# Patient Record
Sex: FEMALE | Race: WHITE | NOT HISPANIC OR LATINO | URBAN - METROPOLITAN AREA
[De-identification: names, ages, dates, MRNs, and addresses within clinical notes are randomized per-mention and may not be internally consistent; named-entity substitution may affect disease eponyms.]

---

## 2019-03-24 ENCOUNTER — INPATIENT (INPATIENT)
Facility: HOSPITAL | Age: 29
LOS: 1 days | Discharge: HOME | End: 2019-03-26
Attending: SPECIALIST | Admitting: SPECIALIST
Payer: MEDICAID

## 2019-03-24 VITALS
OXYGEN SATURATION: 100 % | HEART RATE: 71 BPM | SYSTOLIC BLOOD PRESSURE: 155 MMHG | TEMPERATURE: 98 F | RESPIRATION RATE: 18 BRPM | DIASTOLIC BLOOD PRESSURE: 109 MMHG

## 2019-03-24 LAB
ALBUMIN SERPL ELPH-MCNC: 4.7 G/DL — SIGNIFICANT CHANGE UP (ref 3.3–5)
ALP SERPL-CCNC: 50 U/L — SIGNIFICANT CHANGE UP (ref 40–120)
ALT FLD-CCNC: 27 U/L — SIGNIFICANT CHANGE UP (ref 4–33)
ANION GAP SERPL CALC-SCNC: 14 MMO/L — SIGNIFICANT CHANGE UP (ref 7–14)
APPEARANCE UR: CLEAR — SIGNIFICANT CHANGE UP
AST SERPL-CCNC: 21 U/L — SIGNIFICANT CHANGE UP (ref 4–32)
BACTERIA # UR AUTO: NEGATIVE — SIGNIFICANT CHANGE UP
BASOPHILS # BLD AUTO: 0.1 K/UL — SIGNIFICANT CHANGE UP (ref 0–0.2)
BASOPHILS NFR BLD AUTO: 0.8 % — SIGNIFICANT CHANGE UP (ref 0–2)
BILIRUB SERPL-MCNC: 0.3 MG/DL — SIGNIFICANT CHANGE UP (ref 0.2–1.2)
BILIRUB UR-MCNC: NEGATIVE — SIGNIFICANT CHANGE UP
BLOOD UR QL VISUAL: SIGNIFICANT CHANGE UP
BUN SERPL-MCNC: 10 MG/DL — SIGNIFICANT CHANGE UP (ref 7–23)
CALCIUM SERPL-MCNC: 9.5 MG/DL — SIGNIFICANT CHANGE UP (ref 8.4–10.5)
CHLORIDE SERPL-SCNC: 103 MMOL/L — SIGNIFICANT CHANGE UP (ref 98–107)
CO2 SERPL-SCNC: 22 MMOL/L — SIGNIFICANT CHANGE UP (ref 22–31)
COLOR SPEC: YELLOW — SIGNIFICANT CHANGE UP
CREAT SERPL-MCNC: 0.81 MG/DL — SIGNIFICANT CHANGE UP (ref 0.5–1.3)
EOSINOPHIL # BLD AUTO: 0.09 K/UL — SIGNIFICANT CHANGE UP (ref 0–0.5)
EOSINOPHIL NFR BLD AUTO: 0.7 % — SIGNIFICANT CHANGE UP (ref 0–6)
GLUCOSE SERPL-MCNC: 98 MG/DL — SIGNIFICANT CHANGE UP (ref 70–99)
GLUCOSE UR-MCNC: NEGATIVE — SIGNIFICANT CHANGE UP
HCT VFR BLD CALC: 39.9 % — SIGNIFICANT CHANGE UP (ref 34.5–45)
HGB BLD-MCNC: 13.6 G/DL — SIGNIFICANT CHANGE UP (ref 11.5–15.5)
HYALINE CASTS # UR AUTO: SIGNIFICANT CHANGE UP
IMM GRANULOCYTES NFR BLD AUTO: 0.4 % — SIGNIFICANT CHANGE UP (ref 0–1.5)
KETONES UR-MCNC: SIGNIFICANT CHANGE UP
LEUKOCYTE ESTERASE UR-ACNC: NEGATIVE — SIGNIFICANT CHANGE UP
LIDOCAIN IGE QN: 29.7 U/L — SIGNIFICANT CHANGE UP (ref 7–60)
LYMPHOCYTES # BLD AUTO: 2.7 K/UL — SIGNIFICANT CHANGE UP (ref 1–3.3)
LYMPHOCYTES # BLD AUTO: 21.8 % — SIGNIFICANT CHANGE UP (ref 13–44)
MCHC RBC-ENTMCNC: 31.3 PG — SIGNIFICANT CHANGE UP (ref 27–34)
MCHC RBC-ENTMCNC: 34.1 % — SIGNIFICANT CHANGE UP (ref 32–36)
MCV RBC AUTO: 91.7 FL — SIGNIFICANT CHANGE UP (ref 80–100)
MONOCYTES # BLD AUTO: 0.66 K/UL — SIGNIFICANT CHANGE UP (ref 0–0.9)
MONOCYTES NFR BLD AUTO: 5.3 % — SIGNIFICANT CHANGE UP (ref 2–14)
NEUTROPHILS # BLD AUTO: 8.8 K/UL — HIGH (ref 1.8–7.4)
NEUTROPHILS NFR BLD AUTO: 71 % — SIGNIFICANT CHANGE UP (ref 43–77)
NITRITE UR-MCNC: NEGATIVE — SIGNIFICANT CHANGE UP
NRBC # FLD: 0 K/UL — LOW (ref 25–125)
PH UR: 6 — SIGNIFICANT CHANGE UP (ref 5–8)
PLATELET # BLD AUTO: 238 K/UL — SIGNIFICANT CHANGE UP (ref 150–400)
PMV BLD: 11 FL — SIGNIFICANT CHANGE UP (ref 7–13)
POTASSIUM SERPL-MCNC: 3.5 MMOL/L — SIGNIFICANT CHANGE UP (ref 3.5–5.3)
POTASSIUM SERPL-SCNC: 3.5 MMOL/L — SIGNIFICANT CHANGE UP (ref 3.5–5.3)
PROT SERPL-MCNC: 8.2 G/DL — SIGNIFICANT CHANGE UP (ref 6–8.3)
PROT UR-MCNC: 30 — SIGNIFICANT CHANGE UP
RBC # BLD: 4.35 M/UL — SIGNIFICANT CHANGE UP (ref 3.8–5.2)
RBC # FLD: 11.6 % — SIGNIFICANT CHANGE UP (ref 10.3–14.5)
RBC CASTS # UR COMP ASSIST: SIGNIFICANT CHANGE UP (ref 0–?)
SODIUM SERPL-SCNC: 139 MMOL/L — SIGNIFICANT CHANGE UP (ref 135–145)
SP GR SPEC: 1.03 — SIGNIFICANT CHANGE UP (ref 1–1.04)
SQUAMOUS # UR AUTO: SIGNIFICANT CHANGE UP
UROBILINOGEN FLD QL: NORMAL — SIGNIFICANT CHANGE UP
WBC # BLD: 12.4 K/UL — HIGH (ref 3.8–10.5)
WBC # FLD AUTO: 12.4 K/UL — HIGH (ref 3.8–10.5)
WBC UR QL: SIGNIFICANT CHANGE UP (ref 0–?)

## 2019-03-24 RX ORDER — FAMOTIDINE 10 MG/ML
20 INJECTION INTRAVENOUS ONCE
Qty: 0 | Refills: 0 | Status: COMPLETED | OUTPATIENT
Start: 2019-03-24 | End: 2019-03-24

## 2019-03-24 RX ORDER — SODIUM CHLORIDE 9 MG/ML
1000 INJECTION INTRAMUSCULAR; INTRAVENOUS; SUBCUTANEOUS ONCE
Qty: 0 | Refills: 0 | Status: COMPLETED | OUTPATIENT
Start: 2019-03-24 | End: 2019-03-24

## 2019-03-24 RX ORDER — ONDANSETRON 8 MG/1
4 TABLET, FILM COATED ORAL ONCE
Qty: 0 | Refills: 0 | Status: COMPLETED | OUTPATIENT
Start: 2019-03-24 | End: 2019-03-24

## 2019-03-24 RX ORDER — MORPHINE SULFATE 50 MG/1
4 CAPSULE, EXTENDED RELEASE ORAL ONCE
Qty: 0 | Refills: 0 | Status: DISCONTINUED | OUTPATIENT
Start: 2019-03-24 | End: 2019-03-24

## 2019-03-24 RX ADMIN — SODIUM CHLORIDE 1000 MILLILITER(S): 9 INJECTION INTRAMUSCULAR; INTRAVENOUS; SUBCUTANEOUS at 22:38

## 2019-03-24 RX ADMIN — FAMOTIDINE 20 MILLIGRAM(S): 10 INJECTION INTRAVENOUS at 22:37

## 2019-03-24 RX ADMIN — MORPHINE SULFATE 4 MILLIGRAM(S): 50 CAPSULE, EXTENDED RELEASE ORAL at 22:37

## 2019-03-24 RX ADMIN — ONDANSETRON 4 MILLIGRAM(S): 8 TABLET, FILM COATED ORAL at 22:37

## 2019-03-24 NOTE — ED ADULT TRIAGE NOTE - CHIEF COMPLAINT QUOTE
Pt with RUQ pain n/v/d x 2 days. Pt started Zithromax for URI  not sure if pain related  to medication.

## 2019-03-24 NOTE — ED PROVIDER NOTE - ATTENDING CONTRIBUTION TO CARE
Dr. Polo: I performed a face to face bedside interview with patient regarding history of present illness, review of symptoms and past medical history. I completed an independent physical exam.  I have discussed patient's plan of care with PA.   I agree with note as stated above, having amended the EMR as needed to reflect my findings.   This includes HISTORY OF PRESENT ILLNESS, HIV, PAST MEDICAL/SURGICAL/FAMILY/SOCIAL HISTORY, ALLERGIES AND HOME MEDICATIONS, REVIEW OF SYSTEMS, PHYSICAL EXAM, and any PROGRESS NOTES during the time I functioned as the attending physician for this patient.    28F with no sig pmh here with ruq pain, n/v x 1.5 days.    on exam hypertensive, afebrile  +RLQ ttp, no rebound  no CVA tenderness    biliary colic vs  cholecystitis, r/o pancreatitis. possible gastritis. Plan for RUQ US, labs,  pain control, antiemetics.

## 2019-03-24 NOTE — ED PROVIDER NOTE - CLINICAL SUMMARY MEDICAL DECISION MAKING FREE TEXT BOX
28yF w/pmhx HTN p/w RUQ pain, nausea and vomiting x yesterday. Was started on z-maryse 3 days ago for URI symptoms. On exam TTP RUQ. Will get US to r/o cholecystitis, cbc/cmp/lipase, fluids GI cocktail, morphine for pain. Will continue to reassess.

## 2019-03-24 NOTE — ED ADULT NURSE REASSESSMENT NOTE - NS ED NURSE REASSESS COMMENT FT1
Pt c/o RUQ pain n/v/d x 2 days. Pt started Zithromax for URI  not sure if pain related  to medication. Pt appears stable and in no apparent distress. respirations equal, nonlabored , no sign of respiratory distress. Abdomen soft, tender to touch in right upper and lower quadrants, no distention noted. 20 gauge IV placed in the left AC, labs sent. Medicated as per orders. Denies chest pain, sob. No vomiting noted at this time. pt stable.

## 2019-03-24 NOTE — ED PROVIDER NOTE - PROGRESS NOTE DETAILS
SUSANA Franks: Pt remains tender following 2 rounds of morphine, surgery consulted for US equivocal for acute javier SUSANA Franks: Surgery saw pt, she can be admitted to , pt agrees with admission

## 2019-03-24 NOTE — ED PROVIDER NOTE - OBJECTIVE STATEMENT
28yF w/pmhx HTN presenting with right upper quadrant abdominal pain with nausea and vomiting x yesterday. Pt states she saw her PMD on Thursday for URI symptoms and was started on a Z-maryse. Pt states last night she developed right upper quadrant pain, constant, with radiation to the back associated with nausea and at least episodes of NBNB emesis. Pt denies diarrhea (in constant to triage note), normal BM this morning. No aggravating or alleviating symptoms. Pt denies hx of prior similar pain. Pt reports intermittent chills since last week. Pt denies fever, chest pain, shortness of breath, diarrhea, dysuria, urinary frequency, recent travel, headache, dizziness, lightheadedness or any other concerns.    ID [id number]

## 2019-03-25 DIAGNOSIS — K80.20 CALCULUS OF GALLBLADDER WITHOUT CHOLECYSTITIS WITHOUT OBSTRUCTION: ICD-10-CM

## 2019-03-25 LAB
ALBUMIN SERPL ELPH-MCNC: 3.7 G/DL — SIGNIFICANT CHANGE UP (ref 3.3–5)
ALP SERPL-CCNC: 40 U/L — SIGNIFICANT CHANGE UP (ref 40–120)
ALT FLD-CCNC: 26 U/L — SIGNIFICANT CHANGE UP (ref 4–33)
ANION GAP SERPL CALC-SCNC: 13 MMO/L — SIGNIFICANT CHANGE UP (ref 7–14)
APTT BLD: 26.8 SEC — LOW (ref 27.5–36.3)
APTT BLD: 27.5 SEC — SIGNIFICANT CHANGE UP (ref 27.5–36.3)
AST SERPL-CCNC: 20 U/L — SIGNIFICANT CHANGE UP (ref 4–32)
BILIRUB SERPL-MCNC: 0.3 MG/DL — SIGNIFICANT CHANGE UP (ref 0.2–1.2)
BLD GP AB SCN SERPL QL: NEGATIVE — SIGNIFICANT CHANGE UP
BLD GP AB SCN SERPL QL: NEGATIVE — SIGNIFICANT CHANGE UP
BUN SERPL-MCNC: 7 MG/DL — SIGNIFICANT CHANGE UP (ref 7–23)
CALCIUM SERPL-MCNC: 8.7 MG/DL — SIGNIFICANT CHANGE UP (ref 8.4–10.5)
CHLORIDE SERPL-SCNC: 105 MMOL/L — SIGNIFICANT CHANGE UP (ref 98–107)
CO2 SERPL-SCNC: 20 MMOL/L — LOW (ref 22–31)
CREAT SERPL-MCNC: 0.66 MG/DL — SIGNIFICANT CHANGE UP (ref 0.5–1.3)
GLUCOSE SERPL-MCNC: 104 MG/DL — HIGH (ref 70–99)
HCT VFR BLD CALC: 35.1 % — SIGNIFICANT CHANGE UP (ref 34.5–45)
HGB BLD-MCNC: 12.2 G/DL — SIGNIFICANT CHANGE UP (ref 11.5–15.5)
INR BLD: 0.99 — SIGNIFICANT CHANGE UP (ref 0.88–1.17)
INR BLD: 1 — SIGNIFICANT CHANGE UP (ref 0.88–1.17)
MCHC RBC-ENTMCNC: 32.3 PG — SIGNIFICANT CHANGE UP (ref 27–34)
MCHC RBC-ENTMCNC: 34.8 % — SIGNIFICANT CHANGE UP (ref 32–36)
MCV RBC AUTO: 92.9 FL — SIGNIFICANT CHANGE UP (ref 80–100)
NRBC # FLD: 0 K/UL — LOW (ref 25–125)
PLATELET # BLD AUTO: 199 K/UL — SIGNIFICANT CHANGE UP (ref 150–400)
PMV BLD: 10.8 FL — SIGNIFICANT CHANGE UP (ref 7–13)
POTASSIUM SERPL-MCNC: 3.9 MMOL/L — SIGNIFICANT CHANGE UP (ref 3.5–5.3)
POTASSIUM SERPL-SCNC: 3.9 MMOL/L — SIGNIFICANT CHANGE UP (ref 3.5–5.3)
PROT SERPL-MCNC: 6.5 G/DL — SIGNIFICANT CHANGE UP (ref 6–8.3)
PROTHROM AB SERPL-ACNC: 11 SEC — SIGNIFICANT CHANGE UP (ref 9.8–13.1)
PROTHROM AB SERPL-ACNC: 11.1 SEC — SIGNIFICANT CHANGE UP (ref 9.8–13.1)
RBC # BLD: 3.78 M/UL — LOW (ref 3.8–5.2)
RBC # FLD: 11.6 % — SIGNIFICANT CHANGE UP (ref 10.3–14.5)
RH IG SCN BLD-IMP: POSITIVE — SIGNIFICANT CHANGE UP
RH IG SCN BLD-IMP: POSITIVE — SIGNIFICANT CHANGE UP
SODIUM SERPL-SCNC: 138 MMOL/L — SIGNIFICANT CHANGE UP (ref 135–145)
WBC # BLD: 11.05 K/UL — HIGH (ref 3.8–10.5)
WBC # FLD AUTO: 11.05 K/UL — HIGH (ref 3.8–10.5)

## 2019-03-25 PROCEDURE — 76705 ECHO EXAM OF ABDOMEN: CPT | Mod: 26

## 2019-03-25 PROCEDURE — 88304 TISSUE EXAM BY PATHOLOGIST: CPT | Mod: 26

## 2019-03-25 PROCEDURE — 74018 RADEX ABDOMEN 1 VIEW: CPT | Mod: 26

## 2019-03-25 RX ORDER — METOPROLOL TARTRATE 50 MG
5 TABLET ORAL EVERY 6 HOURS
Qty: 0 | Refills: 0 | Status: DISCONTINUED | OUTPATIENT
Start: 2019-03-25 | End: 2019-03-25

## 2019-03-25 RX ORDER — SODIUM CHLORIDE 9 MG/ML
1000 INJECTION, SOLUTION INTRAVENOUS
Qty: 0 | Refills: 0 | Status: DISCONTINUED | OUTPATIENT
Start: 2019-03-25 | End: 2019-03-25

## 2019-03-25 RX ORDER — HYDROMORPHONE HYDROCHLORIDE 2 MG/ML
1 INJECTION INTRAMUSCULAR; INTRAVENOUS; SUBCUTANEOUS EVERY 4 HOURS
Qty: 0 | Refills: 0 | Status: DISCONTINUED | OUTPATIENT
Start: 2019-03-25 | End: 2019-03-25

## 2019-03-25 RX ORDER — MONTELUKAST 4 MG/1
10 TABLET, CHEWABLE ORAL DAILY
Qty: 0 | Refills: 0 | Status: DISCONTINUED | OUTPATIENT
Start: 2019-03-25 | End: 2019-03-26

## 2019-03-25 RX ORDER — ACETAMINOPHEN 500 MG
1000 TABLET ORAL ONCE
Qty: 0 | Refills: 0 | Status: COMPLETED | OUTPATIENT
Start: 2019-03-25 | End: 2019-03-25

## 2019-03-25 RX ORDER — OXYCODONE HYDROCHLORIDE 5 MG/1
5 TABLET ORAL EVERY 4 HOURS
Qty: 0 | Refills: 0 | Status: DISCONTINUED | OUTPATIENT
Start: 2019-03-25 | End: 2019-03-26

## 2019-03-25 RX ORDER — IBUPROFEN 200 MG
600 TABLET ORAL EVERY 8 HOURS
Qty: 0 | Refills: 0 | Status: DISCONTINUED | OUTPATIENT
Start: 2019-03-25 | End: 2019-03-26

## 2019-03-25 RX ORDER — MONTELUKAST 4 MG/1
1 TABLET, CHEWABLE ORAL
Qty: 0 | Refills: 0 | COMMUNITY

## 2019-03-25 RX ORDER — HYDROMORPHONE HYDROCHLORIDE 2 MG/ML
0.5 INJECTION INTRAMUSCULAR; INTRAVENOUS; SUBCUTANEOUS
Qty: 0 | Refills: 0 | Status: DISCONTINUED | OUTPATIENT
Start: 2019-03-26 | End: 2019-03-26

## 2019-03-25 RX ORDER — HYDROMORPHONE HYDROCHLORIDE 2 MG/ML
0.5 INJECTION INTRAMUSCULAR; INTRAVENOUS; SUBCUTANEOUS EVERY 4 HOURS
Qty: 0 | Refills: 0 | Status: DISCONTINUED | OUTPATIENT
Start: 2019-03-25 | End: 2019-03-25

## 2019-03-25 RX ORDER — BISOPROLOL FUMARATE 10 MG/1
1 TABLET, FILM COATED ORAL
Qty: 0 | Refills: 0 | COMMUNITY

## 2019-03-25 RX ORDER — MORPHINE SULFATE 50 MG/1
4 CAPSULE, EXTENDED RELEASE ORAL ONCE
Qty: 0 | Refills: 0 | Status: DISCONTINUED | OUTPATIENT
Start: 2019-03-25 | End: 2019-03-25

## 2019-03-25 RX ORDER — ACETAMINOPHEN 500 MG
650 TABLET ORAL EVERY 6 HOURS
Qty: 0 | Refills: 0 | Status: DISCONTINUED | OUTPATIENT
Start: 2019-03-25 | End: 2019-03-26

## 2019-03-25 RX ORDER — ENOXAPARIN SODIUM 100 MG/ML
40 INJECTION SUBCUTANEOUS DAILY
Qty: 0 | Refills: 0 | Status: DISCONTINUED | OUTPATIENT
Start: 2019-03-25 | End: 2019-03-26

## 2019-03-25 RX ADMIN — Medication 5 MILLIGRAM(S): at 11:35

## 2019-03-25 RX ADMIN — MONTELUKAST 10 MILLIGRAM(S): 4 TABLET, CHEWABLE ORAL at 04:22

## 2019-03-25 RX ADMIN — Medication 5 MILLIGRAM(S): at 17:52

## 2019-03-25 RX ADMIN — Medication 5 MILLIGRAM(S): at 05:23

## 2019-03-25 RX ADMIN — Medication 400 MILLIGRAM(S): at 20:03

## 2019-03-25 RX ADMIN — HYDROMORPHONE HYDROCHLORIDE 0.5 MILLIGRAM(S): 2 INJECTION INTRAMUSCULAR; INTRAVENOUS; SUBCUTANEOUS at 14:25

## 2019-03-25 RX ADMIN — SODIUM CHLORIDE 100 MILLILITER(S): 9 INJECTION, SOLUTION INTRAVENOUS at 18:05

## 2019-03-25 RX ADMIN — ENOXAPARIN SODIUM 40 MILLIGRAM(S): 100 INJECTION SUBCUTANEOUS at 11:36

## 2019-03-25 RX ADMIN — MORPHINE SULFATE 4 MILLIGRAM(S): 50 CAPSULE, EXTENDED RELEASE ORAL at 00:48

## 2019-03-25 RX ADMIN — SODIUM CHLORIDE 100 MILLILITER(S): 9 INJECTION, SOLUTION INTRAVENOUS at 05:24

## 2019-03-25 RX ADMIN — HYDROMORPHONE HYDROCHLORIDE 0.5 MILLIGRAM(S): 2 INJECTION INTRAMUSCULAR; INTRAVENOUS; SUBCUTANEOUS at 14:10

## 2019-03-25 RX ADMIN — SODIUM CHLORIDE 100 MILLILITER(S): 9 INJECTION, SOLUTION INTRAVENOUS at 20:05

## 2019-03-25 RX ADMIN — Medication 1000 MILLIGRAM(S): at 20:35

## 2019-03-25 NOTE — ED ADULT NURSE NOTE - OBJECTIVE STATEMENT
Pt c/o RUQ pain n/v/d x 2 days. Pt started Zithromax for URI  not sure if pain related  to medication. Pt appears stable and in no apparent distress. respirations equal, nonlabored , no sign of respiratory distress. Abdomen soft, tender to touch in right upper and lower quadrants, no distention noted. 20 gauge IV placed in the left AC, labs sent. Medicated as per orders. Denies chest pain, sob. No vomiting noted at this time. pt stabl

## 2019-03-25 NOTE — H&P ADULT - NSHPLABSRESULTS_GEN_ALL_CORE
13.6   12.40 )-----------( 238      ( 24 Mar 2019 22:30 )             39.9       03-24    139  |  103  |  10  ----------------------------<  98  3.5   |  22  |  0.81    Ca    9.5      24 Mar 2019 22:30    TPro  8.2  /  Alb  4.7  /  TBili  0.3  /  DBili  x   /  AST  21  /  ALT  27  /  AlkPhos  50  03-24    < from: US Abdomen Limited (03.25.19 @ 00:14) >      EXAM:  US ABDOMEN LIMITED        PROCEDURE DATE:  Mar 25 2019         INTERPRETATION:  Clinical indication: Right upper quadrant pain, nausea,   vomiting, assess cholecystitis.    Technique: Targeted right upper quadrant ultrasound of the abdomen was   performed.      Comparison: None.    Findings: This study was technically difficult due to bowel gas.    LIVER: Within normal limits.  BILIARY: No intrahepatic or extrahepatic biliary dilatation. Visualized   common bile duct measuring up to 0.4 cm.   GALLBLADDER: Cholelithiasis without significant gallbladder wall   thickening or pericholecystic fluid. Negative sonographic Romero sign.   However, the patient was premedicated.  PANCREAS: Unremarkable visualized portion.  RIGHT KIDNEY: 11.0 cmin length. No hydronephrosis or obvious renal   stone.   ADDITIONAL: No ascites.     Impression:      Sonographic findings equivocal for acute cholecystitis. If there is   clinical suspicion for acute cholecystitis, a hepatobiliary scan may be   obtained for further evaluation.      KIANNA RENEE M.D., ATTENDING RADIOLOGIST  This document has been electronically signed. Mar 25 2019 12:19AM                  < end of copied text >

## 2019-03-25 NOTE — DISCHARGE NOTE PROVIDER - NSDCCPCAREPLAN_GEN_ALL_CORE_FT
PRINCIPAL DISCHARGE DIAGNOSIS  Diagnosis: Cholelithiasis  Assessment and Plan of Treatment: DIET: You may resume your regular diet.  BATHING: Please do not submerge wound underwater for one week. You may shower and/or sponge bathe.  ACTIVITY: No heavy lifting or straining for 2 weeks. Otherwise, you may return to your usual level of physical activity. If you are taking narcotic pain medication (such as Percocet) DO NOT drive or make important decisions.  NOTIFY YOUR SURGEON IF: You have any bleeding that does not stop, any pus draining from your wound(s), any fever (over 100.4 F) or chills, persistent nausea/vomiting, persistent diarrhea, or if your pain is not controlled on your discharge pain medications.  WOUND CARE:  Please keep incisions clean and dry. Please do not scrub or rub incisions. Do not use lotion or powder on incisions.

## 2019-03-25 NOTE — H&P ADULT - HISTORY OF PRESENT ILLNESS
27 yo woman with HTN presenting with unremitting RUQ abdominal pain for the past day associated with emesis. She reports that two days prior to admission (Friday) she first experienced right-sided abdominal pain radiating to her back at night that resolved by the morning. However, today, her pain was persistent and associated with emesis and inability to tolerate PO. She denies fevers, chills or diarrhea.  Patient's last PO intake was at 6pm the day of admission.     In the ED, patient afebrile although she has a leukocytosis of 12. She has normal Tbili and lipase. She underwent an ultrasound that identified cholelithiasis but no gallbladder wall thickening or pericholecystic fluid. 29 yo [place] citizen-speaking woman with HTN presenting with unremitting RUQ abdominal pain for the past day associated with emesis. She reports that two days prior to admission (Friday) she first experienced right-sided abdominal pain radiating to her back at night that resolved by the morning. However, today, her pain was persistent and associated with emesis and inability to tolerate PO. She denies fevers, chills or diarrhea.  Patient's last PO intake was at 6pm the day of admission.     In the ED, patient afebrile although she has a leukocytosis of 12. She has normal Tbili and lipase. She underwent an ultrasound that identified cholelithiasis but no gallbladder wall thickening or pericholecystic fluid.

## 2019-03-25 NOTE — DISCHARGE NOTE PROVIDER - HOSPITAL COURSE
27 yo [place]-speaking woman with HTN presenting with unremitting RUQ abdominal pain for one day associated with emesis.         In the ED, patient afebrile although she has a leukocytosis of 12. She has normal Tbili and lipase.     She underwent an ultrasound that identified cholelithiasis but no gallbladder wall thickening or pericholecystic fluid.         On 3/25, she underwent Laparoscopic Cholecystectomy.     The patient tolerated the procedure well without complications. The patient was then extubated to PACU, and then deemed stable for transfer to a floor unit. On the floor, the patient's pain was well controlled, tolerating a regular diet, ambulating, and voiding spontaneously. The patient was then discharged to home POD1 with instructions to follow up with Dr. Bates in 10-14 days. 27 yo [place]-speaking woman with HTN presenting with unremitting RUQ abdominal pain for one day associated with emesis.         In the ED, patient afebrile although she has a leukocytosis of 12. She has normal Tbili and lipase.     She underwent an ultrasound that identified cholelithiasis but no gallbladder wall thickening or pericholecystic fluid.         On 3/25, she underwent Laparoscopic Cholecystectomy.     The patient tolerated the procedure well without complications. The patient was then extubated to PACU, and then deemed stable for transfer to a floor unit. On the floor, the patient's pain was well controlled, tolerating a regular diet, ambulating, and voiding spontaneously. The patient was then discharged to home POD1 with instructions to follow up with Dr. Bates in 10-14 days.        istop #: [id number]

## 2019-03-25 NOTE — DISCHARGE NOTE PROVIDER - CARE PROVIDER_API CALL
Ozzy Bates)  Surgery  [address], [address]  [place] [address]  Phone: [phone]  Fax: [phone]  Follow Up Time:

## 2019-03-25 NOTE — H&P ADULT - ASSESSMENT
29 yo woman presenting with acute cholecystitis    - NPO / IVF  - No antibiotics per Attending  - IV pain control   - Type and Screen, coags in am   - Added on and consented for lap javier, possible open with intraoperative cholangiogram     Discussed above with Surgical Attending, Dr. Jana Isaac MD PGY3  [id number]

## 2019-03-26 VITALS
HEART RATE: 80 BPM | OXYGEN SATURATION: 99 % | TEMPERATURE: 98 F | DIASTOLIC BLOOD PRESSURE: 68 MMHG | SYSTOLIC BLOOD PRESSURE: 112 MMHG | RESPIRATION RATE: 18 BRPM

## 2019-03-26 LAB
BACTERIA UR CULT: SIGNIFICANT CHANGE UP
SPECIMEN SOURCE: SIGNIFICANT CHANGE UP

## 2019-03-26 RX ORDER — IBUPROFEN 200 MG
1 TABLET ORAL
Qty: 0 | Refills: 0 | COMMUNITY
Start: 2019-03-26

## 2019-03-26 RX ORDER — ONDANSETRON 8 MG/1
4 TABLET, FILM COATED ORAL ONCE
Qty: 0 | Refills: 0 | Status: COMPLETED | OUTPATIENT
Start: 2019-03-26 | End: 2019-03-26

## 2019-03-26 RX ORDER — OXYCODONE HYDROCHLORIDE 5 MG/1
1 TABLET ORAL
Qty: 18 | Refills: 0 | OUTPATIENT
Start: 2019-03-26 | End: 2019-03-28

## 2019-03-26 RX ORDER — ACETAMINOPHEN 500 MG
2 TABLET ORAL
Qty: 0 | Refills: 0 | COMMUNITY
Start: 2019-03-26

## 2019-03-26 RX ORDER — SODIUM CHLORIDE 9 MG/ML
1000 INJECTION, SOLUTION INTRAVENOUS
Qty: 0 | Refills: 0 | Status: DISCONTINUED | OUTPATIENT
Start: 2019-03-26 | End: 2019-03-26

## 2019-03-26 RX ADMIN — Medication 650 MILLIGRAM(S): at 03:00

## 2019-03-26 RX ADMIN — Medication 600 MILLIGRAM(S): at 16:05

## 2019-03-26 RX ADMIN — HYDROMORPHONE HYDROCHLORIDE 0.5 MILLIGRAM(S): 2 INJECTION INTRAMUSCULAR; INTRAVENOUS; SUBCUTANEOUS at 00:21

## 2019-03-26 RX ADMIN — HYDROMORPHONE HYDROCHLORIDE 0.5 MILLIGRAM(S): 2 INJECTION INTRAMUSCULAR; INTRAVENOUS; SUBCUTANEOUS at 00:35

## 2019-03-26 RX ADMIN — Medication 650 MILLIGRAM(S): at 12:02

## 2019-03-26 RX ADMIN — Medication 650 MILLIGRAM(S): at 02:00

## 2019-03-26 RX ADMIN — ENOXAPARIN SODIUM 40 MILLIGRAM(S): 100 INJECTION SUBCUTANEOUS at 12:02

## 2019-03-26 RX ADMIN — Medication 600 MILLIGRAM(S): at 07:07

## 2019-03-26 RX ADMIN — Medication 650 MILLIGRAM(S): at 12:45

## 2019-03-26 RX ADMIN — Medication 600 MILLIGRAM(S): at 15:23

## 2019-03-26 RX ADMIN — ONDANSETRON 4 MILLIGRAM(S): 8 TABLET, FILM COATED ORAL at 00:10

## 2019-03-26 RX ADMIN — Medication 600 MILLIGRAM(S): at 06:37

## 2019-03-26 RX ADMIN — MONTELUKAST 10 MILLIGRAM(S): 4 TABLET, CHEWABLE ORAL at 12:02

## 2019-03-26 RX ADMIN — SODIUM CHLORIDE 100 MILLILITER(S): 9 INJECTION, SOLUTION INTRAVENOUS at 00:10

## 2019-03-26 NOTE — PROGRESS NOTE ADULT - SUBJECTIVE AND OBJECTIVE BOX
Surgery Progress Note     Subjective/24hour Events:   Patient seen and examined.   Yesterday to OR for Laparoscopic Cholecystectomy.    Postoperatively with some pain, improved with medications.   Tolerated some PO, no N/V.   Voided spontaneously.     Vital Signs:  Vital Signs Last 24 Hrs  T(C): 36.9 (25 Mar 2019 23:20), Max: 37.3 (25 Mar 2019 04:27)  T(F): 98.4 (25 Mar 2019 23:20), Max: 99.1 (25 Mar 2019 04:27)  HR: 74 (26 Mar 2019 02:00) (60 - 96)  BP: 138/90 (26 Mar 2019 02:00) (122/69 - 156/93)  BP(mean): 101 (26 Mar 2019 02:00) (98 - 109)  RR: 18 (26 Mar 2019 02:00) (14 - 31)  SpO2: 98% (26 Mar 2019 02:00) (97% - 100%)    CAPILLARY BLOOD GLUCOSE          I&O's Detail    24 Mar 2019 07:01  -  25 Mar 2019 07:00  --------------------------------------------------------  IN:    lactated ringers.: 100 mL  Total IN: 100 mL    OUT:  Total OUT: 0 mL    Total NET: 100 mL      25 Mar 2019 07:01  -  26 Mar 2019 02:35  --------------------------------------------------------  IN:    lactated ringers.: 1200 mL    lactated ringers.: 400 mL  Total IN: 1600 mL    OUT:    Voided: 1550 mL  Total OUT: 1550 mL    Total NET: 50 mL          MEDICATIONS  (STANDING):  acetaminophen   Tablet .. 650 milliGRAM(s) Oral every 6 hours  enoxaparin Injectable 40 milliGRAM(s) SubCutaneous daily  ibuprofen  Tablet. 600 milliGRAM(s) Oral every 8 hours  lactated ringers. 1000 milliLiter(s) (100 mL/Hr) IV Continuous <Continuous>  montelukast 10 milliGRAM(s) Oral daily    MEDICATIONS  (PRN):  HYDROmorphone  Injectable 0.5 milliGRAM(s) IV Push every 10 minutes PRN Severe Pain (7 - 10)  oxyCODONE    IR 5 milliGRAM(s) Oral every 4 hours PRN Severe Pain (7 - 10)      Physical Exam:  Gen: NAD.  Lungs: Non labored breathing.   Ab: Soft, moderate tenderness in RUQ, nondistended. Laparoscopic dressings c/d/i.   Ext: Moves all 4 spontaneously.     Labs:    03-25    138  |  105  |  7   ----------------------------<  104<H>  3.9   |  20<L>  |  0.66    Ca    8.7      25 Mar 2019 05:55    TPro  6.5  /  Alb  3.7  /  TBili  0.3  /  DBili  x   /  AST  20  /  ALT  26  /  AlkPhos  40  03-25    LIVER FUNCTIONS - ( 25 Mar 2019 05:55 )  Alb: 3.7 g/dL / Pro: 6.5 g/dL / ALK PHOS: 40 u/L / ALT: 26 u/L / AST: 20 u/L / GGT: x                                 12.2   11.05 )-----------( 199      ( 25 Mar 2019 05:55 )             35.1     PT/INR - ( 25 Mar 2019 05:55 )   PT: 11.1 SEC;   INR: 1.00          PTT - ( 25 Mar 2019 05:55 )  PTT:27.5 SEC

## 2019-03-26 NOTE — DISCHARGE NOTE NURSING/CASE MANAGEMENT/SOCIAL WORK - NSDCDPATPORTLINK_GEN_ALL_CORE
You can access the [facility], offered by [facility], by registering with the following website: [email or web address]

## 2019-03-26 NOTE — PROGRESS NOTE ADULT - ASSESSMENT
28F with one day of RUQ pain, emesis, found to have acute cholecystitis, now s/p 3/25 Laparoscopic Cholecystectomy.     - Pain control with PO pain meds.   - Regular diet as tolerated.   - Continue home meds.   - DVT ppx: lovenox, sequential compression devices.  - Dispo: home today.     B Team Surgery #[id number]

## 2019-04-03 LAB — SURGICAL PATHOLOGY STUDY: SIGNIFICANT CHANGE UP

## 2019-04-09 ENCOUNTER — EMERGENCY (EMERGENCY)
Facility: HOSPITAL | Age: 29
LOS: 1 days | Discharge: HOME | End: 2019-04-09
Attending: EMERGENCY MEDICINE | Admitting: EMERGENCY MEDICINE
Payer: MEDICAID

## 2019-04-09 VITALS
SYSTOLIC BLOOD PRESSURE: 114 MMHG | TEMPERATURE: 98 F | DIASTOLIC BLOOD PRESSURE: 83 MMHG | OXYGEN SATURATION: 98 % | HEART RATE: 65 BPM | RESPIRATION RATE: 18 BRPM

## 2019-04-09 PROBLEM — I10 ESSENTIAL (PRIMARY) HYPERTENSION: Chronic | Status: ACTIVE | Noted: 2019-03-24

## 2019-04-09 PROCEDURE — 99281 EMR DPT VST MAYX REQ PHY/QHP: CPT

## 2019-04-09 NOTE — ED PROVIDER NOTE - PHYSICAL EXAMINATION
Skin: 3 surgical sites all with staples , well healing , well appearing , no erythema , no edema , no discharge

## 2019-04-09 NOTE — ED PROVIDER NOTE - OBJECTIVE STATEMENT
29 y/o F with non-contributory PMHx presents to ED s/p lap cholecystectomy 2 weeks ago by Dr. Bates. Pt states that she was unable to get appointment through office and notes that address and phone number were wrong. Pt reports that yesterday she had RUQ pain which resolved after taking Tylenol. Pt denies any nausea , vomiting , fever , or problems with PO intake. Associated with these symptoms pt has slight redness at surgical site.

## 2019-04-09 NOTE — ED ADULT TRIAGE NOTE - CHIEF COMPLAINT QUOTE
p/t s/p cholecystectomy 2 weeks ago, unable to find the surgeons office for follow up came to Ed, p/t denies any  pain,  no fever

## 2019-04-09 NOTE — ED PROVIDER NOTE - CLINICAL SUMMARY MEDICAL DECISION MAKING FREE TEXT BOX
Patient examined by Dr. Bates who removed the staples at surgical sites. Outpatient follow up as instructed, no imaging or lab work needed at this time.

## 2019-06-21 NOTE — ED PROVIDER NOTE - MDM ORDERS SUBMITTED SELECTION
Junior Galarza called requesting a refill of the below medication which has been pended for you:     Requested Prescriptions      No prescriptions requested or ordered in this encounter       Last Appointment Date: 3/5/2019  Next Appointment Date: 9/4/2019    Allergies   Allergen Reactions    Penicillins
Not Applicable

## 2020-10-13 NOTE — ED ADULT NURSE NOTE - INTERPRETER NAME
Clindamycin Pregnancy And Lactation Text: This medication can be used in pregnancy if certain situations. Clindamycin is also present in breast milk. yes